# Patient Record
Sex: FEMALE | Race: WHITE | Employment: FULL TIME | URBAN - METROPOLITAN AREA
[De-identification: names, ages, dates, MRNs, and addresses within clinical notes are randomized per-mention and may not be internally consistent; named-entity substitution may affect disease eponyms.]

---
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
## 2025-06-26 ENCOUNTER — OFFICE VISIT (OUTPATIENT)
Age: 20
End: 2025-06-26

## 2025-06-26 VITALS
HEART RATE: 103 BPM | RESPIRATION RATE: 16 BRPM | OXYGEN SATURATION: 98 % | WEIGHT: 119 LBS | TEMPERATURE: 98.5 F | SYSTOLIC BLOOD PRESSURE: 114 MMHG | DIASTOLIC BLOOD PRESSURE: 78 MMHG

## 2025-06-26 DIAGNOSIS — L03.90 ACUTE CELLULITIS: ICD-10-CM

## 2025-06-26 DIAGNOSIS — B36.9 FUNGAL DERMATITIS: Primary | ICD-10-CM

## 2025-06-26 RX ORDER — KETOCONAZOLE 20 MG/G
CREAM TOPICAL
Qty: 30 G | Refills: 1 | Status: SHIPPED | OUTPATIENT
Start: 2025-06-26

## 2025-06-26 RX ORDER — FLUCONAZOLE 150 MG/1
150 TABLET ORAL
Qty: 14 TABLET | Refills: 0 | Status: SHIPPED | OUTPATIENT
Start: 2025-06-26 | End: 2025-09-26

## 2025-06-26 NOTE — PROGRESS NOTES
Hallie Wagner (: 2005) is a 19 y.o. female, New patient, here for evaluation of the following chief complaint(s):  Ankle Injury (Pt presents w/ an injury to her LT ankle she states that a dog leash rubbed against the back of her ankle causing and abrasion she states that it happened 2 wks ago [place] now she states that she believes it is infected she states that she has burning and itching located to the abrasion she states that she keeps it covered w/ a bandage all the time and notices yellowish drainage on it when changing pt denies any fever chills or vomiting)          ASSESSMENT/PLAN:    ICD-10-CM    1. Fungal dermatitis  B36.9 fluconazole (DIFLUCAN) 150 MG tablet     ketoconazole (NIZORAL) 2 % cream      2. Acute cellulitis  L03.90             Dermatitis  Given patient presentation with symptoms of a rash that itches and is painful when scratched, is red and is spreading but hurts when scratched, scaling, and exam findings of a circumferential, scaly erythematous  Ketoconazole cream prescribed for treatment of the rash that I suspect is fungal dermatitis  Fluconazole prescribed to treat the fungal infection  Stay well hydrated  Oral antihistamine to help control reaction and itch- I recommend Claritin   Keep rash clean and dry, avoid covering with bandages.   Use an antimicrobial wash daily such as HibiclensDiscussed PCP follow up for persisting or worsening symptoms, or to return to the clinic if unable to obtain PCP follow up for worsening symptoms.    The patient tolerated their visit well. The patient and/or the family were informed of the results of any tests, a time was given to answer questions, a plan was proposed and they agreed with plan. Reviewed AVS with treatment instructions and answered questions - pt/family expresses understanding and agreement with the discussed treatment plan and AVS instructions.      SUBJECTIVE/OBJECTIVE:  HPI:   19 y.o. female

## 2025-06-26 NOTE — PATIENT INSTRUCTIONS
Dermatitis  Ketoconazole cream prescribed for treatment of the rash that I suspect is fungal dermatitis  Fluconazole prescribed to treat the fungal infection  Stay well hydrated  Oral antihistamine to help control reaction and itch- I recommend Claritin   Keep rash clean and dry, avoid covering with bandages.   Use an antimicrobial wash daily such as Hibiclens      Hallie,    Thank you for trusting [facility] with your care. Your decision to come to us means a lot and we are honored to be part of your healthcare journey. We value your trust and hope your experience with us was positive and met your expectations.    We're always looking for ways to improve, and your feedback is incredibly important to us. You will receive a text or email soon asking you how your visit went. for If you could take a moment to share your thoughts, it would mean the world to us. Your input helps us better serve you and others in the community.     Thank you again for choosing us. We're grateful for the opportunity to care for you and your loved ones. We hope to see you again - though we always wish you health and wellness!    Warm regards,    [facility]    PERNELL Grewal and IDALMIS Solano MA